# Patient Record
Sex: MALE | Race: WHITE
[De-identification: names, ages, dates, MRNs, and addresses within clinical notes are randomized per-mention and may not be internally consistent; named-entity substitution may affect disease eponyms.]

---

## 2018-05-19 ENCOUNTER — HOSPITAL ENCOUNTER (EMERGENCY)
Dept: HOSPITAL 47 - EC | Age: 73
Discharge: OTHER INSTITUTION | End: 2018-05-19
Payer: COMMERCIAL

## 2018-05-19 VITALS — TEMPERATURE: 98.9 F

## 2018-05-19 VITALS — RESPIRATION RATE: 18 BRPM

## 2018-05-19 VITALS — SYSTOLIC BLOOD PRESSURE: 155 MMHG | DIASTOLIC BLOOD PRESSURE: 75 MMHG | HEART RATE: 70 BPM

## 2018-05-19 DIAGNOSIS — S32.9XXA: ICD-10-CM
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
## 2018-05-19 DIAGNOSIS — S22.5XXA: Primary | ICD-10-CM

## 2018-05-19 DIAGNOSIS — S32.402A: ICD-10-CM
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
## 2018-05-19 DIAGNOSIS — R40.2410: ICD-10-CM

## 2018-05-19 LAB
ALBUMIN SERPL-MCNC: 4.3 G/DL (ref 3.5–5)
ALP SERPL-CCNC: 45 U/L (ref 38–126)
ALT SERPL-CCNC: 51 U/L (ref 21–72)
ANION GAP SERPL CALC-SCNC: 13 MMOL/L
APTT BLD: 22.1 SEC (ref 22–30)
AST SERPL-CCNC: 50 U/L (ref 17–59)
BASOPHILS # BLD AUTO: 0 K/UL (ref 0–0.2)
BASOPHILS NFR BLD AUTO: 0 %
BUN SERPL-SCNC: 19 MG/DL (ref 9–20)
CALCIUM SPEC-MCNC: 8.9 MG/DL (ref 8.4–10.2)
CHLORIDE SERPL-SCNC: 103 MMOL/L (ref 98–107)
CK SERPL-CCNC: 686 U/L (ref 55–170)
CO2 SERPL-SCNC: 25 MMOL/L (ref 22–30)
EOSINOPHIL # BLD AUTO: 0.1 K/UL (ref 0–0.7)
EOSINOPHIL NFR BLD AUTO: 1 %
ERYTHROCYTE [DISTWIDTH] IN BLOOD BY AUTOMATED COUNT: 4.39 M/UL (ref 4.3–5.9)
ERYTHROCYTE [DISTWIDTH] IN BLOOD: 12.9 % (ref 11.5–15.5)
GLUCOSE SERPL-MCNC: 104 MG/DL (ref 74–99)
HCT VFR BLD AUTO: 41.8 % (ref 39–53)
HGB BLD-MCNC: 14 GM/DL (ref 13–17.5)
INR PPP: 1 (ref ?–1.2)
LYMPHOCYTES # SPEC AUTO: 3 K/UL (ref 1–4.8)
LYMPHOCYTES NFR SPEC AUTO: 24 %
MCH RBC QN AUTO: 31.9 PG (ref 25–35)
MCHC RBC AUTO-ENTMCNC: 33.5 G/DL (ref 31–37)
MCV RBC AUTO: 95.3 FL (ref 80–100)
MONOCYTES # BLD AUTO: 0.6 K/UL (ref 0–1)
MONOCYTES NFR BLD AUTO: 5 %
NEUTROPHILS # BLD AUTO: 8.4 K/UL (ref 1.3–7.7)
NEUTROPHILS NFR BLD AUTO: 68 %
PH UR: 7.5 [PH] (ref 5–8)
PLATELET # BLD AUTO: 204 K/UL (ref 150–450)
POTASSIUM SERPL-SCNC: 4.4 MMOL/L (ref 3.5–5.1)
PROT SERPL-MCNC: 7 G/DL (ref 6.3–8.2)
PROT UR QL: (no result)
PT BLD: 10.1 SEC (ref 9–12)
RBC UR QL: 1 /HPF (ref 0–5)
SODIUM SERPL-SCNC: 141 MMOL/L (ref 137–145)
SP GR UR: 1.01 (ref 1–1.03)
SQUAMOUS UR QL AUTO: <1 /HPF (ref 0–4)
TROPONIN I SERPL-MCNC: <0.012 NG/ML (ref 0–0.03)
UROBILINOGEN UR QL STRIP: <2 MG/DL (ref ?–2)
WBC # BLD AUTO: 12.3 K/UL (ref 3.8–10.6)
WBC #/AREA URNS HPF: 1 /HPF (ref 0–5)

## 2018-05-19 PROCEDURE — 85025 COMPLETE CBC W/AUTO DIFF WBC: CPT

## 2018-05-19 PROCEDURE — 82553 CREATINE MB FRACTION: CPT

## 2018-05-19 PROCEDURE — 85610 PROTHROMBIN TIME: CPT

## 2018-05-19 PROCEDURE — 86900 BLOOD TYPING SEROLOGIC ABO: CPT

## 2018-05-19 PROCEDURE — 80320 DRUG SCREEN QUANTALCOHOLS: CPT

## 2018-05-19 PROCEDURE — 86850 RBC ANTIBODY SCREEN: CPT

## 2018-05-19 PROCEDURE — 99291 CRITICAL CARE FIRST HOUR: CPT

## 2018-05-19 PROCEDURE — 80053 COMPREHEN METABOLIC PANEL: CPT

## 2018-05-19 PROCEDURE — 72170 X-RAY EXAM OF PELVIS: CPT

## 2018-05-19 PROCEDURE — 71045 X-RAY EXAM CHEST 1 VIEW: CPT

## 2018-05-19 PROCEDURE — 73080 X-RAY EXAM OF ELBOW: CPT

## 2018-05-19 PROCEDURE — 85730 THROMBOPLASTIN TIME PARTIAL: CPT

## 2018-05-19 PROCEDURE — 93005 ELECTROCARDIOGRAM TRACING: CPT

## 2018-05-19 PROCEDURE — 86901 BLOOD TYPING SEROLOGIC RH(D): CPT

## 2018-05-19 PROCEDURE — 84484 ASSAY OF TROPONIN QUANT: CPT

## 2018-05-19 PROCEDURE — 71260 CT THORAX DX C+: CPT

## 2018-05-19 PROCEDURE — 81001 URINALYSIS AUTO W/SCOPE: CPT

## 2018-05-19 PROCEDURE — 96374 THER/PROPH/DIAG INJ IV PUSH: CPT

## 2018-05-19 PROCEDURE — 82550 ASSAY OF CK (CPK): CPT

## 2018-05-19 PROCEDURE — 36415 COLL VENOUS BLD VENIPUNCTURE: CPT

## 2018-05-19 PROCEDURE — 74177 CT ABD & PELVIS W/CONTRAST: CPT

## 2018-05-19 PROCEDURE — 80306 DRUG TEST PRSMV INSTRMNT: CPT

## 2018-05-19 NOTE — XR
AP pelvis

 

HISTORY: Trauma and pain

 

Single frontal view of the pelvis

 

There is a fracture through the superior pubic ramus as well as acetabulum on the left. Inferior pubi
c rami also show lucencies compatible with fractures bilaterally. No evident dislocation.

 

IMPRESSION: Pelvic fractures.

## 2018-05-19 NOTE — ED
Fall HPI





- General


Chief Complaint: Fall


Stated Complaint: Fall


Time Seen by Provider: 05/19/18 14:29


Source: patient, family, EMS, RN notes reviewed


Mode of arrival: EMS





- History of Present Illness


Initial Comments: 





This is a 73-year-old male who was at the top of a ladder working on a hoist 

when the person he was working with apparently leg when the police pulled him 

off the ladder he fell approximately 10-12 feet onto a concrete floor landing 

on his left side he had no loss of consciousness denies any head neck or back 

pain is complaining of left rib and left hip pain.  He was able to get up on 

all fours and crawl but he was very short of breath he states.  The patient 

denies any symptoms prior to the fall.  He apparently is not on blood thinners.

  No other complaints at this time he was brought in by EMS he was given IV 

fentanyl he was backboarded and C-collared initially.  I was able to clinically 

remove the backboard and c-collar after examination.


MD Complaint: fall





- Related Data


 Allergies











Allergy/AdvReac Type Severity Reaction Status Date / Time


 


No Known Allergies Allergy   Verified 05/19/18 14:41














Review of Systems


ROS Statement: 


Those systems with pertinent positive or pertinent negative responses have been 

documented in the HPI.





ROS Other: All systems not noted in ROS Statement are negative.





Past Medical History


Past Medical History: Prostate Disorder


History of Any Multi-Drug Resistant Organisms: None Reported


Past Surgical History: No Surgical Hx Reported


Past Psychological History: ADD/ADHD


Smoking Status: Never smoker


Past Alcohol Use History: Occasional


Past Drug Use History: None Reported





General Exam





- General Exam Comments


Initial Comments: 





This is a well-developed well-nourished awake alert oriented times female he 

does demonstrate a Radha Coma Scale of 15


Limitations: no limitations


General appearance: alert, anxious


Head exam: Present: atraumatic, normocephalic, normal inspection


Eye exam: Present: normal appearance, PERRL, EOMI.  Absent: scleral icterus, 

conjunctival injection, periorbital swelling


ENT exam: Present: normal exam, mucous membranes moist


Neck exam: Present: normal inspection.  Absent: tenderness, meningismus, 

lymphadenopathy


Respiratory exam: Present: normal lung sounds bilaterally, chest wall 

tenderness (Tenderness palpation no left lateral chest wall no definite step-

off or crepitation).  Absent: respiratory distress, wheezes, rales, rhonchi, 

stridor


Cardiovascular Exam: Present: regular rate, normal rhythm, normal heart sounds.

  Absent: systolic murmur, diastolic murmur, rubs, gallop, clicks


GI/Abdominal exam: Present: soft, normal bowel sounds.  Absent: distended, 

tenderness, guarding, rebound, rigid, bruit, pulsatile mass, hernia


Rectal exam: Present: normal inspection


 exam: Present: normal inspection


Extremities exam: Present: normal inspection, tenderness (Tenderness palpation 

of the left hip no definite deformity no step-off no crepitation is some 

tenderness palpation of the posterior pelvis), normal capillary refill, other (

There is tenderness palpation of the left elbow).  Absent: pedal edema, joint 

swelling, calf tenderness


Back exam: Present: normal inspection


Neurological exam: Present: alert, oriented X3, CN II-XII intact


Psychiatric exam: Present: normal affect, normal mood


Skin exam: Present: warm, dry, intact, normal color.  Absent: rash





Course


 Vital Signs











  05/19/18 05/19/18





  14:31 16:39


 


Pulse Rate 65 69


 


Respiratory 24 18





Rate  


 


Blood Pressure 194/98 169/77


 


O2 Sat by Pulse 98 98





Oximetry  














- Reevaluation(s)


Reevaluation #1: 





05/19/18 17:01


Reevaluation patient after return from CAT scan reveals no changes he feels 

comfortable his lungs he doesn't move.


Reevaluation #2: 





05/19/18 17:04


Reevaluation patient reveals no change he still awake alert oriented 3 he has 

Nelson Coma Scale of 15 I did review all the imaging fractures of ribs 4 

through 8 with some comminuted fractures noted no pneumothorax.  He does have 

an acetabular fracture and pubic ramus fracture.  Elbow x-ray is negative for 

fracture.





Medical Decision Making





- Medical Decision Making





I did a long discussion with patient and his family regarding findings the 

patient was not a level one or 2 trauma patient does have an acetabular 

fracture and will require a higher level of care I did discuss the case with 

Dr. Diego at [facility].  Patient be transferred year to ER.  Patient and 

family are in agreement with this.  Patient will be transferred by EMS he will 

receive IV pain medication prior to transfer





- Lab Data


Result diagrams: 


 05/19/18 14:35





 05/19/18 14:35


 Lab Results











  05/19/18 05/19/18 05/19/18 Range/Units





  14:35 14:35 14:35 


 


WBC    12.3 H  (3.8-10.6)  k/uL


 


RBC    4.39  (4.30-5.90)  m/uL


 


Hgb    14.0  (13.0-17.5)  gm/dL


 


Hct    41.8  (39.0-53.0)  %


 


MCV    95.3  (80.0-100.0)  fL


 


MCH    31.9  (25.0-35.0)  pg


 


MCHC    33.5  (31.0-37.0)  g/dL


 


RDW    12.9  (11.5-15.5)  %


 


Plt Count    204  (150-450)  k/uL


 


Neutrophils %    68  %


 


Lymphocytes %    24  %


 


Monocytes %    5  %


 


Eosinophils %    1  %


 


Basophils %    0  %


 


Neutrophils #    8.4 H  (1.3-7.7)  k/uL


 


Lymphocytes #    3.0  (1.0-4.8)  k/uL


 


Monocytes #    0.6  (0-1.0)  k/uL


 


Eosinophils #    0.1  (0-0.7)  k/uL


 


Basophils #    0.0  (0-0.2)  k/uL


 


PT     (9.0-12.0)  sec


 


INR     (<1.2)  


 


APTT     (22.0-30.0)  sec


 


Sodium  141    (137-145)  mmol/L


 


Potassium  4.4    (3.5-5.1)  mmol/L


 


Chloride  103    ()  mmol/L


 


Carbon Dioxide  25    (22-30)  mmol/L


 


Anion Gap  13    mmol/L


 


BUN  19    (9-20)  mg/dL


 


Creatinine  0.82    (0.66-1.25)  mg/dL


 


Est GFR (CKD-EPI)AfAm  >90    (>60 ml/min/1.73 sqM)  


 


Est GFR (CKD-EPI)NonAf  89    (>60 ml/min/1.73 sqM)  


 


Glucose  104 H    (74-99)  mg/dL


 


Calcium  8.9    (8.4-10.2)  mg/dL


 


Total Bilirubin  0.5    (0.2-1.3)  mg/dL


 


AST  50    (17-59)  U/L


 


ALT  51    (21-72)  U/L


 


Alkaline Phosphatase  45    ()  U/L


 


Total Creatine Kinase   686 H   ()  U/L


 


CK-MB (CK-2)   10.2 H*   (0.0-2.4)  ng/mL


 


CK-MB (CK-2) Rel Index   1.5   


 


Troponin I   <0.012   (0.000-0.034)  ng/mL


 


Total Protein  7.0    (6.3-8.2)  g/dL


 


Albumin  4.3    (3.5-5.0)  g/dL


 


Urine Color     


 


Urine Appearance     (Clear)  


 


Urine pH     (5.0-8.0)  


 


Ur Specific Gravity     (1.001-1.035)  


 


Urine Protein     (Negative)  


 


Urine Glucose (UA)     (Negative)  


 


Urine Ketones     (Negative)  


 


Urine Blood     (Negative)  


 


Urine Nitrite     (Negative)  


 


Urine Bilirubin     (Negative)  


 


Urine Urobilinogen     (<2.0)  mg/dL


 


Ur Leukocyte Esterase     (Negative)  


 


Urine RBC     (0-5)  /hpf


 


Urine WBC     (0-5)  /hpf


 


Ur Squamous Epith Cells     (0-4)  /hpf


 


Urine Mucus     (None)  /hpf


 


Urine Sperm     (None)  /hpf


 


Serum Alcohol  <10    mg/dL


 


Blood Type     


 


Blood Type Confirm     


 


Blood Type Recheck     


 


Antibody Screen     


 


Spec Expiration Date     














  05/19/18 05/19/18 05/19/18 Range/Units





  14:35 14:45 15:06 


 


WBC     (3.8-10.6)  k/uL


 


RBC     (4.30-5.90)  m/uL


 


Hgb     (13.0-17.5)  gm/dL


 


Hct     (39.0-53.0)  %


 


MCV     (80.0-100.0)  fL


 


MCH     (25.0-35.0)  pg


 


MCHC     (31.0-37.0)  g/dL


 


RDW     (11.5-15.5)  %


 


Plt Count     (150-450)  k/uL


 


Neutrophils %     %


 


Lymphocytes %     %


 


Monocytes %     %


 


Eosinophils %     %


 


Basophils %     %


 


Neutrophils #     (1.3-7.7)  k/uL


 


Lymphocytes #     (1.0-4.8)  k/uL


 


Monocytes #     (0-1.0)  k/uL


 


Eosinophils #     (0-0.7)  k/uL


 


Basophils #     (0-0.2)  k/uL


 


PT  10.1    (9.0-12.0)  sec


 


INR  1.0    (<1.2)  


 


APTT  22.1    (22.0-30.0)  sec


 


Sodium     (137-145)  mmol/L


 


Potassium     (3.5-5.1)  mmol/L


 


Chloride     ()  mmol/L


 


Carbon Dioxide     (22-30)  mmol/L


 


Anion Gap     mmol/L


 


BUN     (9-20)  mg/dL


 


Creatinine     (0.66-1.25)  mg/dL


 


Est GFR (CKD-EPI)AfAm     (>60 ml/min/1.73 sqM)  


 


Est GFR (CKD-EPI)NonAf     (>60 ml/min/1.73 sqM)  


 


Glucose     (74-99)  mg/dL


 


Calcium     (8.4-10.2)  mg/dL


 


Total Bilirubin     (0.2-1.3)  mg/dL


 


AST     (17-59)  U/L


 


ALT     (21-72)  U/L


 


Alkaline Phosphatase     ()  U/L


 


Total Creatine Kinase     ()  U/L


 


CK-MB (CK-2)     (0.0-2.4)  ng/mL


 


CK-MB (CK-2) Rel Index     


 


Troponin I     (0.000-0.034)  ng/mL


 


Total Protein     (6.3-8.2)  g/dL


 


Albumin     (3.5-5.0)  g/dL


 


Urine Color   Light Yellow   


 


Urine Appearance   Clear   (Clear)  


 


Urine pH   7.5   (5.0-8.0)  


 


Ur Specific Gravity   1.014   (1.001-1.035)  


 


Urine Protein   1+ H   (Negative)  


 


Urine Glucose (UA)   Negative   (Negative)  


 


Urine Ketones   Negative   (Negative)  


 


Urine Blood   Negative   (Negative)  


 


Urine Nitrite   Negative   (Negative)  


 


Urine Bilirubin   Negative   (Negative)  


 


Urine Urobilinogen   <2.0   (<2.0)  mg/dL


 


Ur Leukocyte Esterase   Negative   (Negative)  


 


Urine RBC   1   (0-5)  /hpf


 


Urine WBC   1   (0-5)  /hpf


 


Ur Squamous Epith Cells   <1   (0-4)  /hpf


 


Urine Mucus   Rare H   (None)  /hpf


 


Urine Sperm   Moderate H   (None)  /hpf


 


Serum Alcohol     mg/dL


 


Blood Type     


 


Blood Type Confirm    A Negative  


 


Blood Type Recheck     


 


Antibody Screen     


 


Spec Expiration Date     














  05/19/18 Range/Units





  15:08 


 


WBC   (3.8-10.6)  k/uL


 


RBC   (4.30-5.90)  m/uL


 


Hgb   (13.0-17.5)  gm/dL


 


Hct   (39.0-53.0)  %


 


MCV   (80.0-100.0)  fL


 


MCH   (25.0-35.0)  pg


 


MCHC   (31.0-37.0)  g/dL


 


RDW   (11.5-15.5)  %


 


Plt Count   (150-450)  k/uL


 


Neutrophils %   %


 


Lymphocytes %   %


 


Monocytes %   %


 


Eosinophils %   %


 


Basophils %   %


 


Neutrophils #   (1.3-7.7)  k/uL


 


Lymphocytes #   (1.0-4.8)  k/uL


 


Monocytes #   (0-1.0)  k/uL


 


Eosinophils #   (0-0.7)  k/uL


 


Basophils #   (0-0.2)  k/uL


 


PT   (9.0-12.0)  sec


 


INR   (<1.2)  


 


APTT   (22.0-30.0)  sec


 


Sodium   (137-145)  mmol/L


 


Potassium   (3.5-5.1)  mmol/L


 


Chloride   ()  mmol/L


 


Carbon Dioxide   (22-30)  mmol/L


 


Anion Gap   mmol/L


 


BUN   (9-20)  mg/dL


 


Creatinine   (0.66-1.25)  mg/dL


 


Est GFR (CKD-EPI)AfAm   (>60 ml/min/1.73 sqM)  


 


Est GFR (CKD-EPI)NonAf   (>60 ml/min/1.73 sqM)  


 


Glucose   (74-99)  mg/dL


 


Calcium   (8.4-10.2)  mg/dL


 


Total Bilirubin   (0.2-1.3)  mg/dL


 


AST   (17-59)  U/L


 


ALT   (21-72)  U/L


 


Alkaline Phosphatase   ()  U/L


 


Total Creatine Kinase   ()  U/L


 


CK-MB (CK-2)   (0.0-2.4)  ng/mL


 


CK-MB (CK-2) Rel Index   


 


Troponin I   (0.000-0.034)  ng/mL


 


Total Protein   (6.3-8.2)  g/dL


 


Albumin   (3.5-5.0)  g/dL


 


Urine Color   


 


Urine Appearance   (Clear)  


 


Urine pH   (5.0-8.0)  


 


Ur Specific Gravity   (1.001-1.035)  


 


Urine Protein   (Negative)  


 


Urine Glucose (UA)   (Negative)  


 


Urine Ketones   (Negative)  


 


Urine Blood   (Negative)  


 


Urine Nitrite   (Negative)  


 


Urine Bilirubin   (Negative)  


 


Urine Urobilinogen   (<2.0)  mg/dL


 


Ur Leukocyte Esterase   (Negative)  


 


Urine RBC   (0-5)  /hpf


 


Urine WBC   (0-5)  /hpf


 


Ur Squamous Epith Cells   (0-4)  /hpf


 


Urine Mucus   (None)  /hpf


 


Urine Sperm   (None)  /hpf


 


Serum Alcohol   mg/dL


 


Blood Type  A Negative  


 


Blood Type Confirm   


 


Blood Type Recheck  CABO Indicated  


 


Antibody Screen  NEGATIVE  


 


Spec Expiration Date  05/22/2018 - 2306  














- EKG Data


-: EKG Interpreted by Me


EKG shows normal: sinus rhythm (Sinus rhythm rate of 67.  Interval 170 QRS 

duration 94 QT since QTC of 432/456 nonspecific T-wave configuration)





- Radiology Data


Radiology results: report reviewed (I did review the imaging and reports x-ray 

showed no definite acute findings however CAT scan did show multiple rib 

fractures on the left consistent with a flail chest ribs #4,5,6,7 probably#8), 

image reviewed





Critical Care Time


Critical Care Time: Yes


Critical Care Time: 





42 minutes of critical care time which includes initial presentation with 

history physical labs and x-rays ordered.  Monitoring the EMS run and discussed 

with paramedics.  Also reevaluation the patient discussed with the patient 

family discuss with the receiving facility ER physician documentation of the 

above





Disposition


Clinical Impression: 


 Flail chest, Pelvic fracture, Left acetabular fracture





Disposition: OTHER INSTITUTION NOT DEFINED


Condition: Serious


Is patient prescribed a controlled substance at d/c from ED?: No


Referrals: 


Nonstaff,Physician [REFERRING] - 1-2 days





- Out of Hospital Transfer - Req. Specs


Out of Hospital Transfer - Requested Specifics: Other Emergency Center

## 2018-05-19 NOTE — XR
EXAMINATION TYPE: XR chest 1V portable

 

DATE OF EXAM: 5/19/2018

 

COMPARISON: NONE

 

HISTORY: Trauma and pain

 

TECHNIQUE: Single frontal view of the chest is obtained.

 

FINDINGS:  There is no focal air space opacity, pleural effusion, or pneumothorax seen.  The cardiac 
silhouette size is within normal limits.  Strand-like linear densities are present at the lung bases.
 There are overlying cardiac leads. The osseous structures are intact.

 

IMPRESSION:  Basilar atelectasis.

## 2018-05-19 NOTE — XR
Left elbow

 

HISTORY: Trauma and pain

 

3 views of the left elbow

 

There is an overlying intravenous catheter and tubing. Osteoarthritic changes are present. Alignment 
and bone mineralization are maintained. Soft tissue calcifications are present. Findings suggest syno
vial osteochondromatosis. There is soft tissue swelling. Difficult to exclude joint effusion.

 

IMPRESSION: No acute fracture or dislocation. Follow-up as indicated.

## 2018-05-19 NOTE — CT
EXAMINATION TYPE: CT ChestAbdPelvis w con

 

DATE OF EXAM: 5/19/2018

 

COMPARISON: Pelvis and chest x-ray same date

 

HISTORY: Fall 10-12 feet from ladder. Left hip pain.

 

CT DLP: 813.6 mGycm

Automated exposure control for dose reduction was used.

 

CONTRAST: 

CT scan of the chest, abdomen and pelvis is performed without Oral Contrast and with IV Contrast, pat
ient injected with 100 mL of Isovue 300.

 

FINDINGS:

 

LUNGS: The lungs are remarkable for some small basilar atelectatic change, possible small lung contus
ion in the left lower lobe, no evident pneumothorax. The tracheobronchial tree is patent.

 

MEDIASTINUM: There are no greater than 1 cm hilar or mediastinal lymph nodes.   No pericardial effusi
on is seen.  

 

AORTA:  No significant abnormality is seen.

 

OTHER:  No additional significant abnormality is seen.

 

LIVER/GB: No significant abnormality is appreciated.

 

PANCREAS: No significant abnormality is seen.

 

SPLEEN: No significant abnormality is seen.

 

ADRENALS: No significant abnormality is seen.

 

KIDNEYS: No significant abnormality is seen. Cortical cyst noted associated with the right kidney

 

REPRODUCTIVE ORGANS: No gross abnormality seen.

 

BOWEL:  No significant abnormality is seen. Umbilical hernia contains fat

 

FREE AIR: No Free Air visible.

 

ASCITES:  None seen.

 

RETROPERITONEAL ADENOPATHY:  No retroperitoneal adenopathy is seen.

 

LYMPH NODES: No greater than 1 cm abdominal or pelvic lymph nodes are appreciated.

 

URINARY BLADDER:  No significant abnormality is seen.

 

PELVIC ADENOPATHY:  None visualized.

 

OSSEOUS STRUCTURES:  There are left-sided rib fractures to include fourth and fifth, sixth and sevent
h ribs on the left. The fifth rib shows a comminuted appearance. The sixth rib shows fractures both p
osteriorly and laterally as does the seventh rib, eighth rib shows a contour anomaly compatible with 
nondisplaced fracture. Findings are compatible with flail chest on the left. There is comminuted frac
ture through the inferior and superior pubic ramus on the left, inferior pubic ramus shows longitudin
al fracture, superior pubic ramus fracture at the level of the junction with the acetabulum which juancarlos
ws a comminuted appearance with minimal displacement and extends into the joint. Lucency noted on shirin
in film at the level of the inferior pubic ramus on the right is artifactual.

 

 IMPRESSION: Flail chest on the left. Pelvic fractures on the left.